# Patient Record
Sex: FEMALE | Race: WHITE
[De-identification: names, ages, dates, MRNs, and addresses within clinical notes are randomized per-mention and may not be internally consistent; named-entity substitution may affect disease eponyms.]

---

## 2018-09-11 ENCOUNTER — HOSPITAL ENCOUNTER (OUTPATIENT)
Dept: HOSPITAL 62 - RAD | Age: 58
End: 2018-09-11
Attending: FAMILY MEDICINE
Payer: COMMERCIAL

## 2018-09-11 DIAGNOSIS — R10.9: Primary | ICD-10-CM

## 2018-09-11 PROCEDURE — 76700 US EXAM ABDOM COMPLETE: CPT

## 2018-09-11 PROCEDURE — 93976 VASCULAR STUDY: CPT

## 2018-09-11 NOTE — RADIOLOGY REPORT (SQ)
EXAM DESCRIPTION:  U/S ABDOMEN COMPLETE W/DOPPLER



COMPLETED DATE/TIME:  9/11/2018 10:11 am



REASON FOR STUDY:  ABDOMINAL PAIN R10.9  UNSPECIFIED ABDOMINAL PAIN



COMPARISON:  None.



TECHNIQUE:  Dynamic and static grayscale images acquired of the abdomen and recorded on PACS. Additio
nal selected color Doppler and spectral images recorded.



LIMITATIONS:  None.



FINDINGS:  PANCREAS: No masses. Visualized pancreatic duct normal caliber.

LIVER: No masses. Echotexture normal.

LIVER VASCULATURE: Normal directional flow of the main portal vein and hepatic veins.

GALLBLADDER: No stones. Normal wall thickness. No pericholecystic fluid.

ULTRASOUND-DETECTED RAMÍREZ'S SIGN: Negative.

INTRAHEPATIC DUCTS AND COMMON DUCT: CBD and intrahepatic ducts normal caliber. No filling defects.

INFERIOR VENA CAVA: Normal flow.

AORTA: No aneurysm.

RIGHT KIDNEY:  Normal size.   Normal echogenicity.   No solid or suspicious masses.   No hydronephros
is.   No calcifications.

LEFT KIDNEY:  Normal size.   Normal echogenicity.   No solid or suspicious masses.   No hydronephrosi
s.   No calcifications.

SPLEEN: Normal size. No solid masses.

PERITONEAL AND PLEURAL SPACES: No ascites or effusions.

OTHER: No other significant finding.



IMPRESSION:  NORMAL ABDOMINAL ULTRASOUND.



TECHNICAL DOCUMENTATION:  JOB ID:  [id number]

 2011 [employer]- All Rights Reserved



Reading location - IP/workstation name: [facility]

## 2020-01-06 ENCOUNTER — HOSPITAL ENCOUNTER (OUTPATIENT)
Dept: HOSPITAL 62 - OD | Age: 60
End: 2020-01-06
Attending: NURSE PRACTITIONER
Payer: COMMERCIAL

## 2020-01-06 DIAGNOSIS — I10: Primary | ICD-10-CM

## 2020-01-06 PROCEDURE — 93005 ELECTROCARDIOGRAM TRACING: CPT

## 2020-01-06 PROCEDURE — 93010 ELECTROCARDIOGRAM REPORT: CPT

## 2020-01-06 NOTE — EKG REPORT
SEVERITY:- ABNORMAL ECG -

SINUS RHYTHM

MULTIPLE VENTRICULAR PREMATURE COMPLEXES

PROBABLE LEFT VENTRICULAR HYPERTROPHY

:

Confirmed by: Mirella Pollock 06-Jan-2020 22:11:05

## 2020-01-07 ENCOUNTER — HOSPITAL ENCOUNTER (OUTPATIENT)
Dept: HOSPITAL 62 - SP | Age: 60
End: 2020-01-07
Attending: NURSE PRACTITIONER
Payer: COMMERCIAL

## 2020-01-07 DIAGNOSIS — Z12.31: Primary | ICD-10-CM

## 2020-01-07 DIAGNOSIS — R01.1: ICD-10-CM

## 2020-01-07 PROCEDURE — 93306 TTE W/DOPPLER COMPLETE: CPT

## 2020-01-07 PROCEDURE — 77067 SCR MAMMO BI INCL CAD: CPT

## 2020-01-07 NOTE — WOMENS IMAGING REPORT
EXAM DESCRIPTION:  BILAT SCREENING MAMMO W/CAD



COMPLETED DATE/TIME:  1/7/2020 7:46 am



REASON FOR STUDY:  Z12.31 SCREENING MAMMO Z12.31  ENCNTR SCREEN MAMMOGRAM FOR MALIGNANT NEOPLASM OF B
RE R01.1  CARDIAC MURMUR, UNSPECIFIED



COMPARISON:  None.



EXAM PARAMETERS:  Standard craniocaudal and mediolateral oblique views of each breast recorded using 
digital acquisition.

Read with the assistance of CAD.

.[facility]  Version 9.2



LIMITATIONS:  None.



FINDINGS:  No suspicious masses, suspicious calcifications or architectural distortion. No areas of c
oncern.



IMPRESSION:  Negative MAMMOGRAM.  BIRADS 1



BREAST DENSITY:  a. The breasts are almost entirely fatty.



BIRAD:  ASSESSMENT:  1 NEGATIVE



RECOMMENDATION:  ROUTINE SCREENING



COMMENT:  The patient has been notified of the results by letter per MQSA requirements. Additional no
tification policies are in place for contacting patient with suspicious or incomplete findings.

Quality ID #225: The American College of Radiology recommends an annual screening mammogram for women
 aged 40 years or over. This facility utilizes a reminder system to ensure that all patients receive 
reminder letters, and/or direct phone calls for appointments. This includes reminders for routine scr
eening mammograms, diagnostic mammograms, or other Breast Imaging Interventions when appropriate.  Th
is patient will be placed in the appropriate reminder system.



TECHNICAL DOCUMENTATION:  FINDING NUMBER: (1)

ASSESSMENT: (1)

JOB ID:  [id number]

 2011 [employer]- All Rights Reserved



Reading location - IP/workstation name: [email or web address]

## 2020-01-07 NOTE — XCELERA REPORT
[address] [address]

                               Tel: [phone]

                               Fax: [phone]



                      Transthoracic Echocardiogram Report

_______________________________________________________________________________



Name: JEREMY ONEAL

MRN: [id number]                                Age: 59 yrs

Gender: Female                                 : 1960

Patient Status: Outpatient                     Patient Location: 

Account #: [id number]

Study Date: 2020 07:54 AM

Accession #: [id number]

_______________________________________________________________________________



Height: 62 in        Weight: 142 lb        BSA: 1.7 m2

_______________________________________________________________________________

Procedure: A complete two-dimensional transthoracic echocardiogram was

performed (2D, M-mode, spectral and color flow Doppler). The study was

technically adequate with some images being suboptimal in quality.

Reason For Study: MURMUR





Ordering Physician: MYESHA CROOK

Performed By: Alyce Arango



_______________________________________________________________________________



Interpretation Summary

Left ventricular systolic function is borderline reduced.

LV diastolic function could not be adequately assessed.

There is mild to moderate concentric left ventricular hypertrophy.

The left ventricle is grossly normal size.

Not all wall segments were well visualized.

[place] not well visualised. Possible apical and inferoapical moderate

hypokinesia

The right ventricular systolic function is normal.

The left atrium is mildly dilated.

The right atrium is normal in size

There is a mild amount of mitral regurgitation

There is no mitral valve stenosis.

There is a moderate amount of aortic regurgitation

There is no aortic valve stenosis

There is moderate pulmonary hypertension by echo

There is a mild to moderate amount of tricuspid regurgitation

Right ventricular systolic pressure is estimated to be elevated at 40-50mmHg.

Minimal pericardial effusion.



MMode/2D Measurements & Calculations

RVDd: 3.1 cm  LVIDd: 5.5 cm   FS: 31.3 %              Ao root diam: 2.7 cm

IVSd: 1.0 cm  LVIDs: 3.8 cm   EDV(Teich): 146.2 ml

                                                      Ao root area: 5.8 cm2

              LVPWd: 0.95 cm  ESV(Teich): 60.6 ml     LA dimension: 4.2 cm

                              EF(Teich): 58.5 %



Doppler Measurements & Calculations

MV E max tera:      MV P1/2t max tera:     Ao V2 max:       AI max tera:

118.5 cm/sec       115.2 cm/sec          166.7 cm/sec     427.7 cm/sec

MV A max tera:      MV P1/2t: 75.3 msec   Ao max PG:       AI max P.8 cm/sec        MVA(P1/2t): 2.9 cm2   11.1 mmHg        73.2 mmHg

MV E/A: 1.3        MV dec slope:                          AI dec slope:

                                                          229.9 cm/sec2

                   448.3 cm/sec2                          AI P1/2t:

                   MV dec time: 0.20 sec                  544.9 msec



        _______________________________________________________________

LV V1 max PG:      TV V2 max:            PA V2 max:       TR max tera:

7.3 mmHg           261.7 cm/sec          90.3 cm/sec      331.2 cm/sec

LV V1 max:         TV max P.4 mmHg  PA max PG:       TR max P.7 cm/sec                             3.3 mmHg         43.9 mmHg

        _______________________________________________________________

AV P1/2t-pr_phl:   MV P1/2t-pr_phl:

544.9 msec         75.3 msec





Left Ventricle

The left ventricle is grossly normal size. There is mild to moderate

concentric left ventricular hypertrophy. Left ventricular systolic function is

borderline reduced. LV diastolic function could not be adequately assessed.

Not all wall segments were well visualized. [place] not well visualised. Possible

apical and inferoapical moderate hypokinesia.



Right Ventricle

The right ventricle is grossly normal size. There is normal right ventricular

wall thickness. The right ventricular systolic function is normal.



Atria

The right atrium is normal in size. The left atrium is mildly dilated.

Interarterial septum not well visualized and not well dopplered. Cannot

comment on ASD/PFO presence.



Mitral Valve

The mitral valve leaflets are sclerotic, but show no functional abnormalities.

There is no mitral valve stenosis. There is a mild amount of mitral

regurgitation.





Aortic Valve

The aortic valve is mildly calcified. The aortic valve is sclerotic and shows

some degree of functional abnormality. There is no aortic valve stenosis.

There is a moderate amount of aortic regurgitation.



Tricuspid Valve

The tricuspid valve is not well visualized, but is grossly normal. There is no

tricuspid stenosis. There is a mild to moderate amount of tricuspid

regurgitation. There is moderate pulmonary hypertension by echo. Right

ventricular systolic pressure is estimated to be elevated at 40-50mmHg.



Pulmonic Valve

The pulmonic valve is not well visualized.



Great Vessels

The aortic root is not well visualized but is probably normal size. The

inferior vena cava appeared normal and decreased > 50% with respiration (RAP

5-10 mmHg).



Effusions

Minimal pericardial effusion.





_______________________________________________________________________________

_______________________________________________________________________________



Electronically signed by:      Mirella Pollock      on 2020 10:22 PM



CC: MYESHA CROOK Shyamal